# Patient Record
Sex: MALE | Race: WHITE | NOT HISPANIC OR LATINO | Employment: UNEMPLOYED | URBAN - NONMETROPOLITAN AREA
[De-identification: names, ages, dates, MRNs, and addresses within clinical notes are randomized per-mention and may not be internally consistent; named-entity substitution may affect disease eponyms.]

---

## 2018-02-12 ENCOUNTER — OFFICE VISIT (OUTPATIENT)
Dept: CARDIAC SURGERY | Facility: CLINIC | Age: 58
End: 2018-02-12

## 2018-02-12 VITALS
OXYGEN SATURATION: 95 % | HEIGHT: 66 IN | BODY MASS INDEX: 32.62 KG/M2 | SYSTOLIC BLOOD PRESSURE: 172 MMHG | HEART RATE: 91 BPM | WEIGHT: 203 LBS | DIASTOLIC BLOOD PRESSURE: 122 MMHG

## 2018-02-12 DIAGNOSIS — M79.89 LEG SWELLING: Primary | ICD-10-CM

## 2018-02-12 DIAGNOSIS — F17.218 NICOTINE DEPENDENCE, CIGARETTES, WITH OTHER NICOTINE-INDUCED DISORDERS: ICD-10-CM

## 2018-02-12 DIAGNOSIS — E66.09 CLASS 1 OBESITY DUE TO EXCESS CALORIES WITH SERIOUS COMORBIDITY AND BODY MASS INDEX (BMI) OF 32.0 TO 32.9 IN ADULT: ICD-10-CM

## 2018-02-12 DIAGNOSIS — G60.9 IDIOPATHIC PERIPHERAL NEUROPATHY: ICD-10-CM

## 2018-02-12 DIAGNOSIS — J43.1 PANLOBULAR EMPHYSEMA (HCC): ICD-10-CM

## 2018-02-12 DIAGNOSIS — I83.813 VARICOSE VEINS OF BOTH LOWER EXTREMITIES WITH PAIN: ICD-10-CM

## 2018-02-12 DIAGNOSIS — I10 BENIGN ESSENTIAL HTN: ICD-10-CM

## 2018-02-12 PROCEDURE — 99204 OFFICE O/P NEW MOD 45 MIN: CPT | Performed by: THORACIC SURGERY (CARDIOTHORACIC VASCULAR SURGERY)

## 2018-02-12 RX ORDER — LISINOPRIL 40 MG/1
40 TABLET ORAL DAILY
COMMUNITY

## 2018-02-12 RX ORDER — LORAZEPAM 0.5 MG/1
0.5 TABLET ORAL EVERY 8 HOURS PRN
COMMUNITY

## 2018-02-12 RX ORDER — PAROXETINE HYDROCHLORIDE 40 MG/1
40 TABLET, FILM COATED ORAL EVERY MORNING
COMMUNITY

## 2018-02-12 RX ORDER — OMEPRAZOLE 20 MG/1
20 CAPSULE, DELAYED RELEASE ORAL DAILY
COMMUNITY

## 2018-02-12 RX ORDER — RISPERIDONE 1 MG/1
1 TABLET ORAL DAILY
COMMUNITY

## 2018-02-12 RX ORDER — BUDESONIDE 0.25 MG/2ML
0.25 INHALANT ORAL
COMMUNITY

## 2018-02-12 RX ORDER — BENZTROPINE MESYLATE 2 MG/1
2 TABLET ORAL 2 TIMES DAILY
COMMUNITY

## 2018-02-12 RX ORDER — FLUTICASONE PROPIONATE 50 MCG
2 SPRAY, SUSPENSION (ML) NASAL DAILY
COMMUNITY

## 2018-02-12 RX ORDER — CETIRIZINE HYDROCHLORIDE 10 MG/1
10 TABLET ORAL DAILY
COMMUNITY

## 2018-02-12 RX ORDER — IBUPROFEN 800 MG/1
800 TABLET ORAL EVERY 6 HOURS PRN
COMMUNITY

## 2018-02-18 PROBLEM — M79.89 LEG SWELLING: Status: ACTIVE | Noted: 2018-02-18

## 2018-02-18 PROBLEM — G60.9 IDIOPATHIC PERIPHERAL NEUROPATHY: Status: ACTIVE | Noted: 2018-02-18

## 2018-02-18 PROBLEM — F17.218 NICOTINE DEPENDENCE, CIGARETTES, WITH OTHER NICOTINE-INDUCED DISORDERS: Status: ACTIVE | Noted: 2018-02-18

## 2018-02-18 PROBLEM — I83.813 VARICOSE VEINS OF BOTH LOWER EXTREMITIES WITH PAIN: Status: ACTIVE | Noted: 2018-02-18

## 2018-02-18 PROBLEM — E66.09 CLASS 1 OBESITY DUE TO EXCESS CALORIES WITH SERIOUS COMORBIDITY AND BODY MASS INDEX (BMI) OF 32.0 TO 32.9 IN ADULT: Status: ACTIVE | Noted: 2018-02-18

## 2018-02-18 PROBLEM — I10 BENIGN ESSENTIAL HTN: Status: ACTIVE | Noted: 2018-02-18

## 2018-02-18 PROBLEM — J43.1 PANLOBULAR EMPHYSEMA: Status: ACTIVE | Noted: 2018-02-18

## 2018-02-18 NOTE — PROGRESS NOTES
2/12/2018    Carrington Reed  1960    Chief Complaint:    Chief Complaint   Patient presents with   • Varicose Veins       HPI:      PCP:  GORDON Grimaldo    57 y.o. male with HTN(stable, increased risk stroke), COPD(stable, increased risk pulmonary complications), Obesity(uncontrolled, increased risk cardiovascular events), leg swelling(new, differential venous insufficiency).  smokes 1 PPD.  Moderate leg numbness x 1 year. Feet ankles swell end of day.  RIGHT knee injury 1980s..  No TIA stroke amaurosis.  No MI claudication. No other associated signs, symptoms or modifying factors.    2/12/2018 Lower extremity venous duplex:  RIGHT no dvt, no reflux.  LEFT no dvt, no reflux.  Mild pulsatile venous flow.    The following portions of the patient's history were reviewed and updated as appropriate: allergies, current medications, past family history, past medical history, past social history, past surgical history and problem list.  Recent images independently reviewed.  Available laboratory values reviewed.    PMH:  Past Medical History:   Diagnosis Date   • COPD (chronic obstructive pulmonary disease)    • HTN (hypertension)    • Nicotine dependence, cigarettes, with other nicotine-induced disorders    • Obesity (BMI 30.0-34.9)    • Peripheral neuropathy    • Varicose veins of both legs with edema        ALLERGIES:  Allergies   Allergen Reactions   • Haldol [Haloperidol] Mental Status Change   • Latex Hives         MEDICATIONS:    Current Outpatient Prescriptions:   •  albuterol (PROVENTIL) (5 MG/ML) 0.5% nebulizer solution, Take 2.5 mg by nebulization Every 6 (Six) Hours As Needed for Wheezing., Disp: , Rfl:   •  benztropine (COGENTIN) 2 MG tablet, Take 2 mg by mouth 2 (Two) Times a Day., Disp: , Rfl:   •  budesonide (PULMICORT) 0.25 MG/2ML nebulizer solution, Take 0.25 mg by nebulization Daily., Disp: , Rfl:   •  cetirizine (zyrTEC) 10 MG tablet, Take 10 mg by mouth Daily., Disp: , Rfl:   •  fluticasone  (FLONASE) 50 MCG/ACT nasal spray, 2 sprays into each nostril Daily., Disp: , Rfl:   •  ibuprofen (ADVIL,MOTRIN) 800 MG tablet, Take 800 mg by mouth Every 6 (Six) Hours As Needed for Mild Pain ., Disp: , Rfl:   •  lisinopril (PRINIVIL,ZESTRIL) 40 MG tablet, Take 40 mg by mouth Daily., Disp: , Rfl:   •  LORazepam (ATIVAN) 0.5 MG tablet, Take 0.5 mg by mouth Every 8 (Eight) Hours As Needed for Anxiety., Disp: , Rfl:   •  omeprazole (priLOSEC) 20 MG capsule, Take 20 mg by mouth Daily., Disp: , Rfl:   •  paliperidone palmitate (INVEGA SUSTENNA) 234 MG/1.5ML suspension IM injection, Inject 234 mg into the shoulder, thigh, or buttocks Every 30 (Thirty) Days., Disp: , Rfl:   •  PARoxetine (PAXIL) 40 MG tablet, Take 40 mg by mouth Every Morning. 1 and 1/2 tablet daily, Disp: , Rfl:   •  risperiDONE (risperDAL) 1 MG tablet, Take 1 mg by mouth Daily., Disp: , Rfl:     Review of Systems   Review of Systems   Constitution: Positive for malaise/fatigue. Negative for night sweats and weight loss.   HENT: Positive for hearing loss. Negative for hoarse voice and stridor.    Eyes: Positive for vision loss in left eye. Negative for vision loss in right eye and visual disturbance.   Cardiovascular: Positive for dyspnea on exertion. Negative for chest pain, leg swelling and palpitations.        Pain with walking  Varicose veins   Respiratory: Positive for shortness of breath. Negative for cough and hemoptysis.    Hematologic/Lymphatic: Negative for adenopathy and bleeding problem. Does not bruise/bleed easily.   Skin: Negative for color change, poor wound healing and rash.   Musculoskeletal: Positive for arthritis, back pain, joint pain, muscle cramps and neck pain. Negative for muscle weakness.   Gastrointestinal: Negative for abdominal pain, dysphagia and heartburn.   Neurological: Negative for dizziness, numbness and seizures.   Psychiatric/Behavioral: Positive for depression. Negative for altered mental status and memory loss. The  patient is nervous/anxious.        Physical Exam   Physical Exam   Constitutional: He is oriented to person, place, and time. He is active and cooperative. He does not appear ill. No distress.   HENT:   Head: Atraumatic.   Right Ear: Hearing normal.   Left Ear: Hearing normal.   Nose: No nasal deformity. No epistaxis.   Mouth/Throat: He does not have dentures. Normal dentition.   Eyes: Conjunctivae and lids are normal. Right pupil is round and reactive. Left pupil is round and reactive.   Neck: No JVD present. Carotid bruit is not present. No tracheal deviation present. No thyroid mass and no thyromegaly present.   Cardiovascular: Normal rate and regular rhythm.    No murmur heard.  Pulses:       Carotid pulses are 2+ on the right side, and 2+ on the left side.       Radial pulses are 2+ on the right side, and 2+ on the left side.        Dorsalis pedis pulses are 2+ on the right side, and 2+ on the left side.        Posterior tibial pulses are 2+ on the right side, and 2+ on the left side.   Few mild varicosities  Moderate spider telangelectasias  1+ edema ankles, feet.  DP PT triphasic doppler signals.   Pulmonary/Chest: Effort normal and breath sounds normal.   Abdominal: Soft. He exhibits no distension and no mass. There is no splenomegaly or hepatomegaly. There is no tenderness.   Musculoskeletal: He exhibits no deformity.   Gait normal.    Lymphadenopathy:     He has no cervical adenopathy.        Right: No supraclavicular adenopathy present.        Left: No supraclavicular adenopathy present.   Neurological: He is alert and oriented to person, place, and time. He has normal strength.   Skin: Skin is warm and dry. No cyanosis or erythema. No pallor.   No venous staining   Psychiatric: He has a normal mood and affect. His speech is normal. Judgment and thought content normal.     BUN 7 - 21 mg/dl 10   Creatinine 0.7 - 1.3 mg/dl 0.8         ASSESSMENT:  Carrington was seen today for varicose veins.    Diagnoses and  all orders for this visit:    Leg swelling  -     Duplex Venous Lower Extremity - Bilateral CAR    Varicose veins of both lower extremities with pain    Idiopathic peripheral neuropathy    Nicotine dependence, cigarettes, with other nicotine-induced disorders    Class 1 obesity due to excess calories with serious comorbidity and body mass index (BMI) of 32.0 to 32.9 in adult    Panlobular emphysema    Benign essential HTN    PLAN:  Detailed discussion with Carrington Reed regarding situation and options.  Mild lower extremity edema, varicosities.  No dvt, reflux.  Normal arterial perfusion to lower extremities.  No vascular etiology for symptoms.  Symptoms appear primary due to neuropathy. Multiple risk factors with severe comorbidities.  No intervention indicated at this time.   Risks, benefits discussed.  Understands and wishes to proceed with plan.    For Symptomatic relief.  Leg elevation when sitting laying down.  Compression stockings 15-20mmHg knee high    Return as needed, if symptoms progress or worsen.  Recommended regular physical activity, progressive walking program.  Continue current medications as directed.  Will Obtain relevant old records.    Thank you for the opportunity to participate in this patient's care.    Copy to primary care provider.    EMR Dragon/Transcription disclaimer:   Much of this encounter note is an electronic transcription/translation of spoken language to printed text. The electronic translation of spoken language may permit erroneous, or at times, nonsensical words or phrases to be inadvertently transcribed; Although I have reviewed the note for such errors, some may still exist.

## 2023-03-23 ENCOUNTER — TRANSCRIBE ORDERS (OUTPATIENT)
Dept: ADMINISTRATIVE | Facility: HOSPITAL | Age: 63
End: 2023-03-23
Payer: MEDICARE

## 2023-03-23 DIAGNOSIS — I73.9 PAD (PERIPHERAL ARTERY DISEASE): Primary | ICD-10-CM
